# Patient Record
Sex: MALE | Race: WHITE
[De-identification: names, ages, dates, MRNs, and addresses within clinical notes are randomized per-mention and may not be internally consistent; named-entity substitution may affect disease eponyms.]

---

## 2021-09-24 ENCOUNTER — HOSPITAL ENCOUNTER (OUTPATIENT)
Dept: HOSPITAL 47 - RADCTMAIN | Age: 46
Discharge: HOME | End: 2021-09-24
Attending: SURGERY
Payer: COMMERCIAL

## 2021-09-24 DIAGNOSIS — R10.13: Primary | ICD-10-CM

## 2021-09-24 PROCEDURE — 74160 CT ABDOMEN W/CONTRAST: CPT

## 2021-09-25 NOTE — CT
EXAMINATION TYPE: CT abdomen w con

 

DATE OF EXAM: 9/24/2021

 

COMPARISON: None

 

HISTORY: Epigastric pain

 

CT DLP: 858 mGycm

 

CONTRAST: 

CT scan of the abdomen  is performed with Oral Contrast and with IV Contrast, patient injected with 1
00 mL of Isovue 300.

 

FINDINGS: 

LUNG BASES-: No visible nodule.  No infiltrate. 

 

LIVER/GB:   No calcified gallstones. Subcentimeter hepatic cyst anterior segment right hepatic lobe. 
Biliary tree is of normal caliber. 

 

PANCREAS:  No inflammation.  No distinct mass. 

 

SPLEEN:  No splenic enlargement.  No lesion seen. 

 

ADRENALS:  No nodule.  No thickening. 

 

KIDNEYS/BLADDER:  No hydronephrosis.  No nephrolithiasis.  No distinct renal mass.  Urinary bladder g
rossly unremarkable. 

 

BOWEL: Visualized bowel loops are of normal caliber.

 

LYMPH NODES:  No greater than 1cm abdominal or pelvic lymph nodes are appreciated.

 

AORTA: No significant abnormality. 

 

OSSEOUS STRUCTURES:  No significant abnormality is seen. 

 

OTHER:  No significant additional abnormality is seen. 

 

IMPRESSION: 

1. No significant abnormality seen.

## 2022-11-22 ENCOUNTER — HOSPITAL ENCOUNTER (EMERGENCY)
Dept: HOSPITAL 47 - EC | Age: 47
Discharge: HOME | End: 2022-11-22
Payer: COMMERCIAL

## 2022-11-22 VITALS
RESPIRATION RATE: 14 BRPM | SYSTOLIC BLOOD PRESSURE: 125 MMHG | DIASTOLIC BLOOD PRESSURE: 72 MMHG | HEART RATE: 77 BPM | TEMPERATURE: 97.8 F

## 2022-11-22 DIAGNOSIS — S61.411A: Primary | ICD-10-CM
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
## 2022-11-22 DIAGNOSIS — Z23: ICD-10-CM
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
## 2022-11-22 PROCEDURE — 90471 IMMUNIZATION ADMIN: CPT

## 2022-11-22 PROCEDURE — 90715 TDAP VACCINE 7 YRS/> IM: CPT

## 2022-11-22 PROCEDURE — 12001 RPR S/N/AX/GEN/TRNK 2.5CM/<: CPT

## 2022-11-22 PROCEDURE — 99282 EMERGENCY DEPT VISIT SF MDM: CPT
